# Patient Record
Sex: MALE | Race: WHITE | Employment: FULL TIME | URBAN - METROPOLITAN AREA
[De-identification: names, ages, dates, MRNs, and addresses within clinical notes are randomized per-mention and may not be internally consistent; named-entity substitution may affect disease eponyms.]

---

## 2022-02-02 ENCOUNTER — OFFICE VISIT (OUTPATIENT)
Dept: URGENT CARE | Facility: URGENT CARE | Age: 27
End: 2022-02-02
Payer: COMMERCIAL

## 2022-02-02 VITALS
SYSTOLIC BLOOD PRESSURE: 117 MMHG | HEART RATE: 85 BPM | DIASTOLIC BLOOD PRESSURE: 79 MMHG | OXYGEN SATURATION: 98 % | TEMPERATURE: 98.4 F

## 2022-02-02 DIAGNOSIS — S99.921A INJURY OF TOE ON RIGHT FOOT, INITIAL ENCOUNTER: Primary | ICD-10-CM

## 2022-02-02 PROCEDURE — 99203 OFFICE O/P NEW LOW 30 MIN: CPT | Performed by: PHYSICIAN ASSISTANT

## 2022-02-02 RX ORDER — ESTRADIOL VALERATE 20 MG/ML
0.2 INJECTION INTRAMUSCULAR
COMMUNITY
Start: 2021-10-14

## 2022-02-02 RX ORDER — PROGESTERONE 100 MG/1
1 CAPSULE ORAL DAILY
COMMUNITY
Start: 2021-11-25

## 2022-02-03 NOTE — PROGRESS NOTES
Chief Complaint   Patient presents with     Urgent Care     Foot Injury     x 1 day stubbed toe on door       ASSESSMENT/PLAN:  Kasi was seen today for urgent care and foot injury.    Diagnoses and all orders for this visit:    Injury of toe on right foot, initial encounter    Superficial laceration of the pinky toe and mild blunt trauma.  Exam reassuring without any significant tenderness and full range of motion.  No foot tenderness.  Unlikely to have any fracture or dislocation.  No need for primary closure laceration.  Discussed wound care and can cosmo tape tell if painful when walking.  If symptoms not improving follow-up in 1 week for x-rays    Dejon Cheatham PA-C      SUBJECTIVE:  Kasi is a 26 year old male who presents to urgent care with a toe injury.  He stubbed his right pinky toe earlier today.  It did bleed and there is a cut on it and want to make sure it is okay.  Has not injured this foot before.  Is able to walk without issues..    ROS: Pertinent ROS neg other than the symptoms noted above in the HPI.     OBJECTIVE:  /79 (BP Location: Right arm, Patient Position: Sitting, Cuff Size: Adult Regular)   Pulse 85   Temp 98.4  F (36.9  C) (Oral)   SpO2 98%    GENERAL: healthy, alert and no distress  MS: No swelling or ecchymosis of the right lower extremity, foot and toes.  Forage motion of the toes.  1 cm superficial laceration not actively bleeding on right pinky toe.  Nailbed intact.  Minimal focal tenderness, no other bony foot or toe tenderness    DIAGNOSTICS    No results found for any visits on 02/02/22.     Current Outpatient Medications   Medication     estradiol valerate (DELESTROGEN) 20 MG/ML injection     progesterone (PROMETRIUM) 100 MG capsule     No current facility-administered medications for this visit.      There is no problem list on file for this patient.     No past medical history on file.  No past surgical history on file.  No family history on file.  Social History      Tobacco Use     Smoking status: Not on file     Smokeless tobacco: Not on file   Substance Use Topics     Alcohol use: Not on file              The plan of care was discussed with the patient. They understand and agree with the course of treatment prescribed. A printed summary was given including instructions and medications.  The use of Dragon/[employer] dictation services may have been used to construct the content in this note; any grammatical or spelling errors are non-intentional. Please contact the author of this note directly if you are in need of any clarification.